# Patient Record
Sex: FEMALE | Race: WHITE | HISPANIC OR LATINO | URBAN - METROPOLITAN AREA
[De-identification: names, ages, dates, MRNs, and addresses within clinical notes are randomized per-mention and may not be internally consistent; named-entity substitution may affect disease eponyms.]

---

## 2018-07-21 ENCOUNTER — EMERGENCY (EMERGENCY)
Facility: HOSPITAL | Age: 58
LOS: 1 days | Discharge: HOME | End: 2018-07-21
Attending: EMERGENCY MEDICINE
Payer: COMMERCIAL

## 2018-07-21 VITALS
SYSTOLIC BLOOD PRESSURE: 110 MMHG | RESPIRATION RATE: 16 BRPM | OXYGEN SATURATION: 99 % | HEIGHT: 60 IN | DIASTOLIC BLOOD PRESSURE: 68 MMHG | WEIGHT: 126.99 LBS | TEMPERATURE: 99 F | HEART RATE: 52 BPM

## 2018-07-21 LAB
ALBUMIN SERPL ELPH-MCNC: 3.8 G/DL — SIGNIFICANT CHANGE UP (ref 3.5–5)
ALP SERPL-CCNC: 105 U/L — SIGNIFICANT CHANGE UP (ref 40–120)
ALT FLD-CCNC: 30 U/L DA — SIGNIFICANT CHANGE UP (ref 10–60)
ANION GAP SERPL CALC-SCNC: 8 MMOL/L — SIGNIFICANT CHANGE UP (ref 5–17)
AST SERPL-CCNC: 18 U/L — SIGNIFICANT CHANGE UP (ref 10–40)
BILIRUB SERPL-MCNC: 0.4 MG/DL — SIGNIFICANT CHANGE UP (ref 0.2–1.2)
BUN SERPL-MCNC: 13 MG/DL — SIGNIFICANT CHANGE UP (ref 7–18)
CALCIUM SERPL-MCNC: 9.1 MG/DL — SIGNIFICANT CHANGE UP (ref 8.4–10.5)
CHLORIDE SERPL-SCNC: 111 MMOL/L — HIGH (ref 96–108)
CO2 SERPL-SCNC: 24 MMOL/L — SIGNIFICANT CHANGE UP (ref 22–31)
CREAT SERPL-MCNC: 0.79 MG/DL — SIGNIFICANT CHANGE UP (ref 0.5–1.3)
GLUCOSE SERPL-MCNC: 105 MG/DL — HIGH (ref 70–99)
HCT VFR BLD CALC: 40.2 % — SIGNIFICANT CHANGE UP (ref 34.5–45)
HGB BLD-MCNC: 13.2 G/DL — SIGNIFICANT CHANGE UP (ref 11.5–15.5)
MCHC RBC-ENTMCNC: 31 PG — SIGNIFICANT CHANGE UP (ref 27–34)
MCHC RBC-ENTMCNC: 32.8 GM/DL — SIGNIFICANT CHANGE UP (ref 32–36)
MCV RBC AUTO: 94.4 FL — SIGNIFICANT CHANGE UP (ref 80–100)
PLATELET # BLD AUTO: 345 K/UL — SIGNIFICANT CHANGE UP (ref 150–400)
POTASSIUM SERPL-MCNC: 3.9 MMOL/L — SIGNIFICANT CHANGE UP (ref 3.5–5.3)
POTASSIUM SERPL-SCNC: 3.9 MMOL/L — SIGNIFICANT CHANGE UP (ref 3.5–5.3)
PROT SERPL-MCNC: 7.3 G/DL — SIGNIFICANT CHANGE UP (ref 6–8.3)
RBC # BLD: 4.26 M/UL — SIGNIFICANT CHANGE UP (ref 3.8–5.2)
RBC # FLD: 12.9 % — SIGNIFICANT CHANGE UP (ref 10.3–14.5)
SODIUM SERPL-SCNC: 143 MMOL/L — SIGNIFICANT CHANGE UP (ref 135–145)
TROPONIN I SERPL-MCNC: <0.015 NG/ML — SIGNIFICANT CHANGE UP (ref 0–0.04)
WBC # BLD: 4.4 K/UL — SIGNIFICANT CHANGE UP (ref 3.8–10.5)
WBC # FLD AUTO: 4.4 K/UL — SIGNIFICANT CHANGE UP (ref 3.8–10.5)

## 2018-07-21 PROCEDURE — 99284 EMERGENCY DEPT VISIT MOD MDM: CPT

## 2018-07-21 PROCEDURE — 99283 EMERGENCY DEPT VISIT LOW MDM: CPT | Mod: 25

## 2018-07-21 PROCEDURE — 84484 ASSAY OF TROPONIN QUANT: CPT

## 2018-07-21 PROCEDURE — 80053 COMPREHEN METABOLIC PANEL: CPT

## 2018-07-21 PROCEDURE — 71046 X-RAY EXAM CHEST 2 VIEWS: CPT

## 2018-07-21 PROCEDURE — 85027 COMPLETE CBC AUTOMATED: CPT

## 2018-07-21 PROCEDURE — 71046 X-RAY EXAM CHEST 2 VIEWS: CPT | Mod: 26

## 2018-07-21 PROCEDURE — 36415 COLL VENOUS BLD VENIPUNCTURE: CPT

## 2018-07-21 PROCEDURE — 93005 ELECTROCARDIOGRAM TRACING: CPT

## 2018-07-21 NOTE — ED PROVIDER NOTE - OBJECTIVE STATEMENT
58 year old female with pmh anxiety presents with multiple days of left arm pain associated with anxiety. no cough no fever no sob. no abd pain.  patient states she had been under increased stress

## 2018-07-21 NOTE — ED PROVIDER NOTE - CHPI ED SYMPTOMS NEG
no nausea/no tingling/no weakness/no vomiting/no decreased eating/drinking/no fever/no chills/no dizziness/no numbness

## 2018-07-21 NOTE — ED PROVIDER NOTE - MEDICAL DECISION MAKING DETAILS
chest pain associated with anxiety. will rule out acs with 1 trop given length of time symptoms have been present. patient has cards f/u this afternoon

## 2018-07-21 NOTE — ED ADULT TRIAGE NOTE - CHIEF COMPLAINT QUOTE
L arm pressure and numbness and tingling for weeks, hx  of anxiety. stated," I have lot of stress lately"

## 2019-09-07 ENCOUNTER — TRANSCRIPTION ENCOUNTER (OUTPATIENT)
Age: 59
End: 2019-09-07

## 2021-05-25 ENCOUNTER — NON-APPOINTMENT (OUTPATIENT)
Age: 61
End: 2021-05-25

## 2021-06-23 ENCOUNTER — APPOINTMENT (OUTPATIENT)
Dept: CARDIOLOGY | Facility: CLINIC | Age: 61
End: 2021-06-23

## 2021-06-25 ENCOUNTER — APPOINTMENT (OUTPATIENT)
Dept: CARDIOLOGY | Facility: CLINIC | Age: 61
End: 2021-06-25
Payer: COMMERCIAL

## 2021-06-25 ENCOUNTER — NON-APPOINTMENT (OUTPATIENT)
Age: 61
End: 2021-06-25

## 2021-06-25 VITALS
TEMPERATURE: 97.8 F | HEIGHT: 60 IN | DIASTOLIC BLOOD PRESSURE: 76 MMHG | WEIGHT: 129 LBS | SYSTOLIC BLOOD PRESSURE: 146 MMHG | OXYGEN SATURATION: 97 % | BODY MASS INDEX: 25.32 KG/M2 | HEART RATE: 56 BPM

## 2021-06-25 VITALS — SYSTOLIC BLOOD PRESSURE: 146 MMHG | DIASTOLIC BLOOD PRESSURE: 70 MMHG

## 2021-06-25 DIAGNOSIS — M48.00 SPINAL STENOSIS, SITE UNSPECIFIED: ICD-10-CM

## 2021-06-25 DIAGNOSIS — K21.9 GASTRO-ESOPHAGEAL REFLUX DISEASE W/OUT ESOPHAGITIS: ICD-10-CM

## 2021-06-25 DIAGNOSIS — Z78.9 OTHER SPECIFIED HEALTH STATUS: ICD-10-CM

## 2021-06-25 DIAGNOSIS — M41.9 SCOLIOSIS, UNSPECIFIED: ICD-10-CM

## 2021-06-25 DIAGNOSIS — M54.30 SCIATICA, UNSPECIFIED SIDE: ICD-10-CM

## 2021-06-25 DIAGNOSIS — Z82.49 FAMILY HISTORY OF ISCHEMIC HEART DISEASE AND OTHER DISEASES OF THE CIRCULATORY SYSTEM: ICD-10-CM

## 2021-06-25 DIAGNOSIS — M62.838 OTHER MUSCLE SPASM: ICD-10-CM

## 2021-06-25 PROCEDURE — 93000 ELECTROCARDIOGRAM COMPLETE: CPT

## 2021-06-25 PROCEDURE — 99204 OFFICE O/P NEW MOD 45 MIN: CPT

## 2021-06-25 RX ORDER — CLONAZEPAM 0.5 MG/1
0.5 TABLET ORAL
Refills: 0 | Status: ACTIVE | COMMUNITY
Start: 2021-06-02

## 2021-06-27 NOTE — PHYSICAL EXAM
[Well Developed] : well developed [Well Nourished] : well nourished [Normal Conjunctiva] : normal conjunctiva [No Acute Distress] : no acute distress [Normal Venous Pressure] : normal venous pressure [No Carotid Bruit] : no carotid bruit [Normal S1, S2] : normal S1, S2 [Clear Lung Fields] : clear lung fields [Good Air Entry] : good air entry [No Respiratory Distress] : no respiratory distress  [Normal Gait] : normal gait [No Edema] : no edema [No Cyanosis] : no cyanosis [No Clubbing] : no clubbing [No Varicosities] : no varicosities [No Rash] : no rash [No Skin Lesions] : no skin lesions [Moves all extremities] : moves all extremities [No Focal Deficits] : no focal deficits [Normal Speech] : normal speech [Alert and Oriented] : alert and oriented [Normal memory] : normal memory

## 2021-06-28 NOTE — CARDIOLOGY SUMMARY
[de-identified] : 6/25/21 SB NSST [de-identified] : 5/2020 NST [place], 10 min, negative for ischemia [de-identified] : 7/2020 Mild aortic valve sclerosis with out stenosis, mod MR, mild TR  [de-identified] : Non-obstructive calcified plaque

## 2021-06-28 NOTE — REASON FOR VISIT
[Other: ____] : [unfilled] [FreeTextEntry1] : This is a 60 year female with the below PMH who presents to office as a new patient looking to establish\par routine cardiovascular care. There has been no recent illness or hospitalization.\par \par Pt had been under the care of Dr. Mae in [place], she recently move out east in [place] and is looking for local Cardiologist, her family had recommended Dr. Santana.\par \par \par She denies chest pain, pressure, palpitations, unusual shortness of breath, orthopnea, LE edema, lightheadedness, dizziness, near syncope or syncope.\par \par Blood pressure reviewed by myself 160/90 at rest.  Patient states she is anxious and did not relax when learning previous blood pressure 140 systolic.  Patient states she has never had high blood pressure readings in the past. \par

## 2021-06-28 NOTE — DISCUSSION/SUMMARY
[FreeTextEntry1] : This is a 60 year F with the above PMH and below problems were addressed during today's cardiovascular care visit.\par Patient verbalizes they understand the plan and any questions and concerns were addressed.\par \par Elevated blood pressure reading without the diagnosis of hypertension:\par Education given to encouraged her to maintain a low salt diet (<2grams/day) and goal to maintain  <130 systolic blood pressure\par Recommended patient take blood pressure readings at home and trend vital signs blood pressure readings to be presented at next follow-up visit.  Patient states she was anxious when learning previous blood pressure was mildly elevated.\par \par Testing reviewed today included previous echo, nuclear stress test, carotid ultrasound, and EKG.  No significant findings at this time we will continue with surveillance monitoring.\par \par \par Follow up with our office in 6  months unless otherwise indicated,\par Discussed red flag symptoms, which would warrant sooner or emergent medical evaluation.\par \par \par Sincerely,\par \par LIOR DavenportC \par Patients history, testing, and plan reviewed with supervising MD: Dr. Román Santana\par \par

## 2021-07-01 ENCOUNTER — NON-APPOINTMENT (OUTPATIENT)
Age: 61
End: 2021-07-01

## 2021-07-02 ENCOUNTER — APPOINTMENT (OUTPATIENT)
Dept: CARDIOLOGY | Facility: CLINIC | Age: 61
End: 2021-07-02
Payer: COMMERCIAL

## 2021-07-02 VITALS
HEIGHT: 60 IN | HEART RATE: 69 BPM | DIASTOLIC BLOOD PRESSURE: 80 MMHG | SYSTOLIC BLOOD PRESSURE: 130 MMHG | OXYGEN SATURATION: 97 % | WEIGHT: 132 LBS | TEMPERATURE: 98.2 F | BODY MASS INDEX: 25.91 KG/M2

## 2021-07-02 DIAGNOSIS — R01.1 CARDIAC MURMUR, UNSPECIFIED: ICD-10-CM

## 2021-07-02 PROCEDURE — 99214 OFFICE O/P EST MOD 30 MIN: CPT

## 2021-07-02 RX ORDER — TIZANIDINE HYDROCHLORIDE 4 MG/1
4 CAPSULE ORAL
Refills: 0 | Status: ACTIVE | COMMUNITY
Start: 2021-06-25

## 2021-07-02 RX ORDER — METOPROLOL TARTRATE 50 MG/1
50 TABLET, FILM COATED ORAL DAILY
Refills: 0 | Status: DISCONTINUED | COMMUNITY
End: 2021-07-02

## 2021-07-02 NOTE — REASON FOR VISIT
[Hypertension] : hypertension [FreeTextEntry1] : \par 62 yo F with recently diagnosed HTN here for follow up for BP optimization. No chest pain/dyspnea. BP uncontrolled at home however she has incorrect technique of checking it. Started toprol 25. Works out regularly. \par \par Cardiographics 2020 reviewed. Carotid, stress test, echo.  Lab work reviewed from April 2021:.\par

## 2021-07-02 NOTE — PHYSICAL EXAM
[Well Developed] : well developed [Well Nourished] : well nourished [No Acute Distress] : no acute distress [Normal Conjunctiva] : normal conjunctiva [Normal Venous Pressure] : normal venous pressure [No Carotid Bruit] : no carotid bruit [Normal S1, S2] : normal S1, S2 [Clear Lung Fields] : clear lung fields [Good Air Entry] : good air entry [No Respiratory Distress] : no respiratory distress  [Normal Gait] : normal gait [No Edema] : no edema [No Cyanosis] : no cyanosis [No Clubbing] : no clubbing [No Varicosities] : no varicosities [No Rash] : no rash [No Skin Lesions] : no skin lesions [Moves all extremities] : moves all extremities [No Focal Deficits] : no focal deficits [Normal Speech] : normal speech [Alert and Oriented] : alert and oriented [Normal memory] : normal memory

## 2021-07-02 NOTE — DISCUSSION/SUMMARY
[FreeTextEntry1] : 62 yo F with recently diagnosed HTN here for follow up for BP optimization. No chest pain/dyspnea. BP uncontrolled at home however she has incorrect technique of checking it. Started toprol 25. Works out regularly. Cardiographics 2020 reviewed. Carotid, stress test, echo.  Lab work reviewed from April 2021:.\par \par \par stop metoprolol\par \par # HTN, no structural CV abnormality and nonischemic stress test 2020: \par - stop metoprolol switch to amlodipine 5 mg, BP check in 1 month, full CV visit in 6 months, adapt plan if needed\par - gave her the correct technique to measure her BP at home.  Maintain blood pressure log still.  Anxiety control\par - Education given to encouraged her to maintain a low salt diet (<2grams/day)\par - yearly labwork\par \par \par BP check in 1 month, full CV visit in 6 months, adapt plan if needed

## 2021-07-02 NOTE — CARDIOLOGY SUMMARY
[de-identified] : 6/25/21 SB NSST [de-identified] : 5/2020 NST [place], 10 min, negative for ischemia [de-identified] : 7/2020 Mild aortic valve sclerosis with out stenosis, mod MR, mild TR  [de-identified] : Non-obstructive calcified plaque

## 2021-08-03 ENCOUNTER — APPOINTMENT (OUTPATIENT)
Dept: CARDIOLOGY | Facility: CLINIC | Age: 61
End: 2021-08-03
Payer: COMMERCIAL

## 2021-08-03 VITALS
WEIGHT: 130 LBS | HEIGHT: 60 IN | SYSTOLIC BLOOD PRESSURE: 150 MMHG | TEMPERATURE: 97.7 F | DIASTOLIC BLOOD PRESSURE: 80 MMHG | OXYGEN SATURATION: 98 % | BODY MASS INDEX: 25.52 KG/M2 | HEART RATE: 64 BPM

## 2021-08-03 PROCEDURE — 99213 OFFICE O/P EST LOW 20 MIN: CPT

## 2021-08-03 RX ORDER — SUCRALFATE 1 G/10ML
1 SUSPENSION ORAL AT BEDTIME
Refills: 0 | Status: DISCONTINUED | COMMUNITY
Start: 2021-06-09 | End: 2021-08-03

## 2021-08-03 NOTE — DISCUSSION/SUMMARY
[FreeTextEntry1] : 62 yo F with recently diagnosed HTN here for follow up for BP optimization. No chest pain/dyspnea.  On last visit, BP was uncontrolled at home however she had incorrect technique of checking it and I switch metoprolol to amlodipine 5.\par In interim, BP log she kept BP scarcely but averaging 130s. Not taking NSAIDs often. BP here elevated. Anxious. started paxil in interim. pending therapist evaluation.\par \par \par # HTN, no structural CV abnormality and nonischemic stress test 2020: \par - on last visit, I stopped metoprolol switched to amlodipine 5 mg. TODAY INCREASE TO AMLODIPINE 10. Low on salt now.\par - gave her the correct technique to measure her BP at home.  Maintain blood pressure log still.  Anxiety control\par - Education given to encouraged her to maintain a low salt diet (<2grams/day)\par - yearly labwork next due 4/2022.\par \par \par CV visit in 6 months, adapt plan if needed. She will call with BP log at home.

## 2021-08-03 NOTE — CARDIOLOGY SUMMARY
[de-identified] : 6/25/21 SB NSST [de-identified] : 5/2020 NST [place], 10 min, negative for ischemia [de-identified] : 7/2020 Mild aortic valve sclerosis with out stenosis, mod MR, mild TR  [de-identified] : Non-obstructive calcified plaque

## 2022-02-16 ENCOUNTER — APPOINTMENT (OUTPATIENT)
Dept: CARDIOLOGY | Facility: CLINIC | Age: 62
End: 2022-02-16
Payer: COMMERCIAL

## 2022-02-16 VITALS
HEART RATE: 54 BPM | WEIGHT: 140 LBS | DIASTOLIC BLOOD PRESSURE: 70 MMHG | HEIGHT: 60 IN | TEMPERATURE: 97.3 F | SYSTOLIC BLOOD PRESSURE: 138 MMHG | OXYGEN SATURATION: 98 % | BODY MASS INDEX: 27.48 KG/M2

## 2022-02-16 DIAGNOSIS — F41.9 ANXIETY DISORDER, UNSPECIFIED: ICD-10-CM

## 2022-02-16 PROCEDURE — 99214 OFFICE O/P EST MOD 30 MIN: CPT

## 2022-02-16 RX ORDER — MELOXICAM 15 MG/1
15 TABLET ORAL
Refills: 0 | Status: ACTIVE | COMMUNITY

## 2022-02-16 RX ORDER — IBUPROFEN 600 MG/1
600 TABLET, FILM COATED ORAL
Refills: 0 | Status: DISCONTINUED | COMMUNITY
Start: 2021-05-28 | End: 2022-02-16

## 2022-02-16 NOTE — HISTORY OF PRESENT ILLNESS
[FreeTextEntry1] : \par 60 yo F with recently diagnosed HTN here for follow up. No chest pain/dyspnea.\par \par INTERIM: edema due to amlodipine 10, tolerating 5. 10 y ascvd 2.59%. taking meloxicam 3-5 x a week for back pain.\par \par PRIOR:  On last visit, BP was uncontrolled at home however she had incorrect technique of checking it and I switch metoprolol to amlodipine 5. Anxious. started paxil in interim. pending therapist evaluation.\par \par \par TESTING:\par 10/2021 labwork: . a1c normal. grossly normal cbc/cmp. \par \par Cardiographics 2020 reviewed. unremarkable Carotid, stress test, echo.  Lab work reviewed from April 2021.\par

## 2022-02-16 NOTE — DISCUSSION/SUMMARY
[FreeTextEntry1] : \par 60 yo F with recently diagnosed HTN here for follow up. INTERIM: edema due to amlodipine 10. 10 y ascvd 2.59%. meloxicam 3-5 x a week for back pain.\par \par \par # HTN, no structural CV abnormality and nonischemic stress test 2020: mildly elevated in office but better controlled at home.\par - continue amlodipine 5 (edema on 10). BP log. decrease NSAID use. \par - Education given to encouraged her to maintain a low salt diet (<2grams/day)\par - yearly labwork next due 10/2022\par \par \par CV visit in 1 year, adapt plan if needed. She will call with BP log at home.

## 2022-02-16 NOTE — CARDIOLOGY SUMMARY
[de-identified] : 6/25/21 SB NSST [de-identified] : 5/2020 NST [place], 10 min, negative for ischemia [de-identified] : 7/2020 Mild aortic valve sclerosis with out stenosis, mod MR, mild TR  [de-identified] : Non-obstructive calcified plaque

## 2022-07-13 ENCOUNTER — NON-APPOINTMENT (OUTPATIENT)
Age: 62
End: 2022-07-13

## 2022-07-15 ENCOUNTER — NON-APPOINTMENT (OUTPATIENT)
Age: 62
End: 2022-07-15

## 2022-07-26 ENCOUNTER — NON-APPOINTMENT (OUTPATIENT)
Age: 62
End: 2022-07-26

## 2022-09-22 ENCOUNTER — APPOINTMENT (OUTPATIENT)
Dept: ORTHOPEDIC SURGERY | Facility: CLINIC | Age: 62
End: 2022-09-22

## 2022-09-22 ENCOUNTER — TRANSCRIPTION ENCOUNTER (OUTPATIENT)
Age: 62
End: 2022-09-22

## 2022-09-22 VITALS — WEIGHT: 138 LBS | BODY MASS INDEX: 27.09 KG/M2 | HEIGHT: 60 IN

## 2022-09-22 PROCEDURE — 73562 X-RAY EXAM OF KNEE 3: CPT | Mod: RT

## 2022-09-22 PROCEDURE — 99204 OFFICE O/P NEW MOD 45 MIN: CPT

## 2022-09-22 NOTE — PHYSICAL EXAM
[Right] : right knee [5___] : hamstring 5[unfilled]/5 [] : non-antalgic [TWNoteComboBox7] : flexion 120 degrees [de-identified] : extension 0 degrees

## 2022-09-22 NOTE — HISTORY OF PRESENT ILLNESS
[de-identified] : Date of Injury/Onset:      6 months\par Pain: At Rest: 0 /10   \par With Activity: 0 /10 \par Affecting Sleep:NO\par Difficulty with stairs: NO\par Difficulty getting in and out of car: NO\par Sit to stand stiffness: YES\par Mechanism of injury:  NKI\par This  is not a Work Related Injury being treated under Worker's Compensation.\par This  is not   an athletic injury occurring associated with an interscholastic or organized sports team.\par Quality of symptoms: C/O CHRONIC SWELLING IN RIGHT KNEE.  NO PAIN\par Improves with:    NOTHING\par Worse with:    \par Previous Treatment/Imaging/Studies Since Last Visit: USING ADVIL\par Reports Available For Review Today: NONE\par \par \par  \par \par

## 2022-09-22 NOTE — DISCUSSION/SUMMARY
[de-identified] : MILD PREPATELLA BURSITIS\par Discussed importance of non-impact exercise and muscle stretching before and after exercise. Reviewed x-rays and knee model. Explained the importance of ice and rest. \par

## 2022-11-22 ENCOUNTER — APPOINTMENT (OUTPATIENT)
Dept: ORTHOPEDIC SURGERY | Facility: CLINIC | Age: 62
End: 2022-11-22

## 2022-11-22 VITALS — HEIGHT: 60 IN | WEIGHT: 138 LBS | BODY MASS INDEX: 27.09 KG/M2

## 2022-11-22 DIAGNOSIS — S43.422A SPRAIN OF LEFT ROTATOR CUFF CAPSULE, INITIAL ENCOUNTER: ICD-10-CM

## 2022-11-22 DIAGNOSIS — M75.42 IMPINGEMENT SYNDROME OF LEFT SHOULDER: ICD-10-CM

## 2022-11-22 PROCEDURE — 99213 OFFICE O/P EST LOW 20 MIN: CPT

## 2022-11-22 PROCEDURE — 73030 X-RAY EXAM OF SHOULDER: CPT | Mod: LT

## 2022-11-22 NOTE — PHYSICAL EXAM
[Left] : left shoulder [] : motor and sensory intact distally [FreeTextEntry8] : tenderness over biceps anteriorly and the supraspinatus [FreeTextEntry9] : internal rotation to T12 [TWNoteComboBox7] : active forward flexion 170 degrees [de-identified] : external rotation 90 degrees

## 2022-11-22 NOTE — DISCUSSION/SUMMARY
[de-identified] : Patient has an MRI of Left shoulder from [facility] in 2020. Need to obtain report and send it over to us\par Patient will go to physical therapy - script given in office today\par She will follow up after the report is obtained

## 2022-11-22 NOTE — HISTORY OF PRESENT ILLNESS
[de-identified] : Date of Injury/Onset:      1 year after going to PT for a bicep tear\par Pain: At Rest:  0/10   \par With Activity:  7/10 \par Affecting Sleep: sometines\par Difficulty with stairs: N/A\par Difficulty getting in and out of car: N/A\par Sit to stand stiffness: N/A\par Mechanism of injury:  \par This  is not a Work Related Injury being treated under Worker's Compensation.\par This is not   an athletic injury occurring associated with an interscholastic or organized sports team.\par Quality of symptoms: sharp, squeezing, tightness\par Improves with:    tigerbalm and hot showers\par Worse with:    lifting\par Previous Treatment/Imaging/Studies Since Last Visit: Left Shoulder MRI from 2020\par PT was helping her in 2020\par Using Tigerbalm daily.\par  \par

## 2022-11-29 ENCOUNTER — FORM ENCOUNTER (OUTPATIENT)
Age: 62
End: 2022-11-29

## 2023-02-06 ENCOUNTER — NON-APPOINTMENT (OUTPATIENT)
Age: 63
End: 2023-02-06

## 2023-02-06 ENCOUNTER — APPOINTMENT (OUTPATIENT)
Dept: CARDIOLOGY | Facility: CLINIC | Age: 63
End: 2023-02-06
Payer: COMMERCIAL

## 2023-02-06 VITALS
OXYGEN SATURATION: 100 % | DIASTOLIC BLOOD PRESSURE: 76 MMHG | TEMPERATURE: 96.9 F | HEIGHT: 60 IN | SYSTOLIC BLOOD PRESSURE: 140 MMHG | WEIGHT: 138 LBS | HEART RATE: 71 BPM | BODY MASS INDEX: 27.09 KG/M2

## 2023-02-06 PROCEDURE — 93000 ELECTROCARDIOGRAM COMPLETE: CPT

## 2023-02-06 PROCEDURE — 99214 OFFICE O/P EST MOD 30 MIN: CPT | Mod: 25

## 2023-02-06 RX ORDER — OMEPRAZOLE 20 MG/1
20 CAPSULE, DELAYED RELEASE ORAL
Refills: 0 | Status: DISCONTINUED | COMMUNITY
Start: 2021-06-09 | End: 2023-02-06

## 2023-02-06 NOTE — CARDIOLOGY SUMMARY
[de-identified] : 6/25/21 SB NSST [de-identified] : 5/2020 NST [place], 10 min, negative for ischemia [de-identified] : 7/2020 Mild aortic valve sclerosis with out stenosis, mod MR, mild TR  [de-identified] : Non-obstructive calcified plaque

## 2023-02-06 NOTE — DISCUSSION/SUMMARY
[FreeTextEntry1] : \par 61 yo F with recently diagnosed HTN here for follow up. \par \par left shoulder pain seeing orthopedics. not taking nsaid. no angina or dyspnea. limiting issue is orthopedic/neurologic. 2+ pulses. \par \par # HTN, no structural CV abnormality and nonischemic stress test 2020: mildly elevated in office but better controlled at home.\par - continue amlodipine 5 (edema on 10). BP log. \par - Education given to encouraged her to maintain a low salt diet (<2grams/day)\par - serial labwork\par - CT CALCIUM SCORE for risk stratification \par \par \par CV visit in 1 year, adapt plan if needed. She will call with BP log at home. Call with CT calcium result.\par

## 2023-02-06 NOTE — HISTORY OF PRESENT ILLNESS
[FreeTextEntry1] : \par 61 yo F with recently diagnosed HTN here for follow up. \par \par 2/2023 VISIT: left shoulder pain seeing orthopedics. not taking nsaid. no angina or dyspnea. limiting issue is orthopedic/neurologic. 2+ pulses. \par \par 2/2022 VISIT: edema due to amlodipine 10, tolerating 5. 10 y ascvd 2.59%. taking meloxicam 3-5 x a week for back pain.\par PRIOR:  On last visit, BP was uncontrolled at home however she had incorrect technique of checking it and I switch metoprolol to amlodipine 5. Anxious. started paxil in interim. pending therapist evaluation.\par \par \par TESTING:\par \par 6/2022 LABWORK: LDL 82.  Grossly normal CMP/TFT.\par \par 10/2021 labwork: . a1c normal. grossly normal cbc/cmp. \par \par Cardiographics 2020 reviewed. unremarkable Carotid, stress test, echo.  Lab work reviewed from April 2021.\par

## 2023-02-07 ENCOUNTER — NON-APPOINTMENT (OUTPATIENT)
Age: 63
End: 2023-02-07

## 2023-02-21 ENCOUNTER — NON-APPOINTMENT (OUTPATIENT)
Age: 63
End: 2023-02-21

## 2023-03-07 ENCOUNTER — FORM ENCOUNTER (OUTPATIENT)
Age: 63
End: 2023-03-07

## 2023-04-18 ENCOUNTER — APPOINTMENT (OUTPATIENT)
Dept: ORTHOPEDIC SURGERY | Facility: CLINIC | Age: 63
End: 2023-04-18

## 2023-05-10 ENCOUNTER — APPOINTMENT (OUTPATIENT)
Dept: ORTHOPEDIC SURGERY | Facility: CLINIC | Age: 63
End: 2023-05-10

## 2023-05-10 ENCOUNTER — APPOINTMENT (OUTPATIENT)
Dept: ORTHOPEDIC SURGERY | Facility: CLINIC | Age: 63
End: 2023-05-10
Payer: COMMERCIAL

## 2023-05-10 DIAGNOSIS — M70.61 TROCHANTERIC BURSITIS, RIGHT HIP: ICD-10-CM

## 2023-05-10 DIAGNOSIS — M16.12 UNILATERAL PRIMARY OSTEOARTHRITIS, LEFT HIP: ICD-10-CM

## 2023-05-10 DIAGNOSIS — I10 ESSENTIAL (PRIMARY) HYPERTENSION: ICD-10-CM

## 2023-05-10 DIAGNOSIS — M16.10 UNILATERAL PRIMARY OSTEOARTHRITIS, UNSPECIFIED HIP: ICD-10-CM

## 2023-05-10 PROCEDURE — 99214 OFFICE O/P EST MOD 30 MIN: CPT

## 2023-05-10 RX ORDER — GABAPENTIN 300 MG/1
300 CAPSULE ORAL
Refills: 0 | Status: DISCONTINUED | COMMUNITY
Start: 2021-06-25 | End: 2023-05-10

## 2023-05-10 RX ORDER — TRAMADOL HYDROCHLORIDE 50 MG/1
50 TABLET, COATED ORAL
Qty: 28 | Refills: 0 | Status: DISCONTINUED | COMMUNITY
Start: 2022-11-01 | End: 2023-05-10

## 2023-05-10 RX ORDER — PREGABALIN 75 MG/1
75 CAPSULE ORAL
Qty: 60 | Refills: 0 | Status: DISCONTINUED | COMMUNITY
Start: 2022-11-01 | End: 2023-05-10

## 2023-05-10 NOTE — PHYSICAL EXAM
[Bilateral] : hip with pelvis bilaterally [Outside films reviewed] : Outside films reviewed [de-identified] : Constitutional: The patient appears well developed, well nourished. Examination of patients ability to communicate functionally was normal.  \par \par  \par \par Neurologic: Coordination is normal. Alert and oriented to time, place and person. No evidence of mood disorder, calm affect.  \par \par  \par \par  RIGHT     HIP/THIGH : Inspection of the hip/thigh is as follows: Inspection shows no swelling, no ecchymosis, no erythema, no rashes and no masses.  \par \par  \par \par Palpation of the hip/thigh is as follows: groin tenderness and GREATER TROCH TTP. no palpable defects and no palpable masses.  \par \par  \par \par Range of motion of the hip is as follows in degrees:  \par \par  \par \par Flexion: 100   \par \par Abduction:  20   \par \par External rotation:  30  \par \par Internal rotaion:  10 \par \par  \par \par Stength testing of the hip/thigh is as follows: \par \par Hip flexion strength:   5/5  \par \par Hip extension strength:  5/5  \par \par Hip abductionstrength:  5/5 \par \par Hip adductionstrength:  5/5 \par \par  \par \par Neurological testing of the hip/thigh is as follows: motor exam 5/5 throughout, light touch intact throughout and no focal motor defecits.  \par \par  \par \par Gait and function is as follows: mildly antalgic gait.  \par \par  \par \par   [FreeTextEntry9] : ap/lat b/l hips show moderate b/l hip DJD joint space narrowing, spurring of the femoral head and CAM lesions b/l

## 2023-05-10 NOTE — DISCUSSION/SUMMARY
[de-identified] : reviewed XR images of hip and reviewed MRI images of hip and lumbar spine\par Options were discussed today including oral anti-inflammatories, Physical Therapy, IA Steroid Injection, oral steroids. The patient had time to ask questions of the different treatment options, including doing nothing and just observing for a finite period of time and re-evaluating in the future. \par Recommend CSI IA - script given\par pt will f/u one month after injection\par pt will try naproxen\par \par Entered by Jane Romero acting as scribe. \par Dr. Guillen Attestation\par The documentation recorded by the scribe, in my presence, accurately reflects the service I, Dr. Guillen, personally performed, and the decisions made by me with my edits as appropriate.\par

## 2023-05-10 NOTE — HISTORY OF PRESENT ILLNESS
[de-identified] : Patient is here today for her right hip.\par \par Had MRI Of LSpine, MRI of R Hip at Stand up, Xray of B/l Hips.\par Doppler was done on March 29th, pt reports this came back negative.\par \par Seeing Neurology currently\par \par Has not returned to pain management. \par \par Patient c/o Right hiup pain lateral into the groin and posterior aspect of the hip with any activity. She notes decreased ROM of the hips.  The Right is much worse than the Left.  \par \par \par \par

## 2023-05-11 ENCOUNTER — RESULT REVIEW (OUTPATIENT)
Age: 63
End: 2023-05-11

## 2023-05-12 RX ORDER — ACETAMINOPHEN AND CODEINE PHOSPHATE 300; 30 MG/1; MG/1
300-30 TABLET ORAL
Qty: 30 | Refills: 0 | Status: ACTIVE | COMMUNITY
Start: 2023-05-12 | End: 1900-01-01

## 2023-06-12 ENCOUNTER — APPOINTMENT (OUTPATIENT)
Dept: ORTHOPEDIC SURGERY | Facility: CLINIC | Age: 63
End: 2023-06-12
Payer: COMMERCIAL

## 2023-06-12 PROCEDURE — 99214 OFFICE O/P EST MOD 30 MIN: CPT

## 2023-06-12 NOTE — DISCUSSION/SUMMARY
[de-identified] : Options were discussed today including oral anti-inflammatories, Physical Therapy, Steroid Injection, Hyalgan injections or Surgery. The patient had time to ask questions of the different treatment options, including doing nothing and just observing for a finite period of time and re-evaluating in the future. \par Patient was given an prescription for a Medrol dose cesar. They were instructed to take the medication as directed and to f/u in 1 month\par \par \par Entered by Cheo Goldstein acting as scribe.\par Dr. Guillen Attestation\par The documentation recorded by the scribe, in my presence, accurately reflects the service I, Dr. Guillen, personally performed, and the decisions made by me with my edits as appropriate.

## 2023-06-12 NOTE — PHYSICAL EXAM
[de-identified] : Constitutional: The patient appears well developed, well nourished. Examination of patients ability to communicate functionally was normal.  \par \par  \par \par Neurologic: Coordination is normal. Alert and oriented to time, place and person. No evidence of mood disorder, calm affect.  \par \par  \par \par  RIGHT     HIP/THIGH : Inspection of the hip/thigh is as follows: Inspection shows no swelling, no ecchymosis, no erythema, no rashes and no masses.  \par \par  \par \par Palpation of the hip/thigh is as follows: groin tenderness and GREATER TROCH TTP. no palpable defects and no palpable masses.  \par \par  \par \par Range of motion of the hip is as follows in degrees:  \par \par  \par \par Flexion: 100   \par \par Abduction:  20   \par \par External rotation:  30  \par \par Internal rotaion:  10 \par \par  \par \par Stength testing of the hip/thigh is as follows: \par \par Hip flexion strength:   5/5  \par \par Hip extension strength:  5/5  \par \par Hip abductionstrength:  5/5 \par \par Hip adductionstrength:  5/5 \par \par  \par \par Neurological testing of the hip/thigh is as follows: motor exam 5/5 throughout, light touch intact throughout and no focal motor defecits.  \par \par  \par \par Gait and function is as follows: mildly antalgic gait.  \par \par  \par \par   [Bilateral] : hip with pelvis bilaterally [Outside films reviewed] : Outside films reviewed [FreeTextEntry9] : ap/lat b/l hips show moderate b/l hip DJD joint space narrowing, spurring of the femoral head and CAM lesions b/l

## 2023-06-12 NOTE — HISTORY OF PRESENT ILLNESS
[de-identified] : Patient is f/u after right hip IA CSI on 5/11/23   . Patient reports this provided no relief for the days following the injection. Patient reports that the pain is radiating down the right leg on the posterior portion of the thigh. \par Patient is going to PM for her lumbar spine. \par Patient will take Percocet for breakthrough pain, also currently taking meloxicam given by neuro. \par Patient took naproxen following last visit, denies this provided relief\par Biofreeze and tiger balm daily, with some relief.

## 2023-07-17 ENCOUNTER — APPOINTMENT (OUTPATIENT)
Dept: ORTHOPEDIC SURGERY | Facility: CLINIC | Age: 63
End: 2023-07-17
Payer: COMMERCIAL

## 2023-07-17 VITALS — HEIGHT: 60 IN | WEIGHT: 138 LBS | BODY MASS INDEX: 27.09 KG/M2

## 2023-07-17 PROCEDURE — 99214 OFFICE O/P EST MOD 30 MIN: CPT

## 2023-07-17 NOTE — DISCUSSION/SUMMARY
[de-identified] : Options were discussed today including oral anti-inflammatories, Physical Therapy, Steroid Injection, Hyalgan injections or Surgery. The patient had time to ask questions of the different treatment options, including doing nothing and just observing for a finite period of time and re-evaluating in the future. \par \par Plan at this time is for IA CSI in the Rt Hip, f/u in 1 month  \par \par Continue PT at this time\par \par Entered by Cheo Goldstein acting as scribe.\par Dr. Guillen Attestation\par The documentation recorded by the scribe, in my presence, accurately reflects the service I, Dr. Guillen, personally performed, and the decisions made by me with my edits as appropriate.

## 2023-07-17 NOTE — PHYSICAL EXAM
[de-identified] : Constitutional: The patient appears well developed, well nourished. Examination of patients ability to communicate functionally was normal.  \par \par  \par \par Neurologic: Coordination is normal. Alert and oriented to time, place and person. No evidence of mood disorder, calm affect.  \par \par  \par \par  RIGHT     HIP/THIGH : Inspection of the hip/thigh is as follows: Inspection shows no swelling, no ecchymosis, no erythema, no rashes and no masses.  \par \par  \par \par Palpation of the hip/thigh is as follows: groin tenderness and GREATER TROCH TTP. no palpable defects and no palpable masses.  \par \par  \par \par Range of motion of the hip is as follows in degrees:  \par \par  \par \par Flexion: 100   \par \par Abduction:  20   \par \par External rotation:  30  \par \par Internal rotaion:  10 \par \par  \par \par Stength testing of the hip/thigh is as follows: \par \par Hip flexion strength:   5/5  \par \par Hip extension strength:  5/5  \par \par Hip abductionstrength:  5/5 \par \par Hip adductionstrength:  5/5 \par \par  \par \par Neurological testing of the hip/thigh is as follows: motor exam 5/5 throughout, light touch intact throughout and no focal motor defecits.  \par \par  \par \par Gait and function is as follows: mildly antalgic gait.  \par \par  \par \par

## 2023-07-17 NOTE — HISTORY OF PRESENT ILLNESS
[de-identified] : follow up patient is here today for her RT Hip. Patient was given an prescription for a Medrol dose cesar.on 6/12/23. patient is currently going to PT for BL hips with good relief. . Pt also does HEP.  Pt had Fluoro guided injection of RT Hip.  Pt also took a Medrol dose pack in the last month.  She states the CSI IA did not help much but themedrol dose pack did help.  She had MRI Right hip 5/1/23 positive for severe hip DJD and labral tearing.

## 2023-08-04 NOTE — ED PROVIDER NOTE - ENMT, MLM
Airway patent, Nasal mucosa clear. Mouth with normal mucosa. Throat has no vesicles, no oropharyngeal exudates and uvula is midline.
FAMILY HISTORY:  No pertinent family history in first degree relatives

## 2023-08-11 ENCOUNTER — RESULT REVIEW (OUTPATIENT)
Age: 63
End: 2023-08-11

## 2023-08-22 ENCOUNTER — NON-APPOINTMENT (OUTPATIENT)
Age: 63
End: 2023-08-22

## 2023-08-23 ENCOUNTER — APPOINTMENT (OUTPATIENT)
Dept: ORTHOPEDIC SURGERY | Facility: CLINIC | Age: 63
End: 2023-08-23
Payer: COMMERCIAL

## 2023-08-23 ENCOUNTER — APPOINTMENT (OUTPATIENT)
Dept: CARDIOLOGY | Facility: CLINIC | Age: 63
End: 2023-08-23
Payer: COMMERCIAL

## 2023-08-23 VITALS
SYSTOLIC BLOOD PRESSURE: 140 MMHG | HEIGHT: 60 IN | BODY MASS INDEX: 27.09 KG/M2 | HEART RATE: 69 BPM | DIASTOLIC BLOOD PRESSURE: 82 MMHG | WEIGHT: 138 LBS | OXYGEN SATURATION: 96 %

## 2023-08-23 PROCEDURE — 99213 OFFICE O/P EST LOW 20 MIN: CPT

## 2023-08-23 PROCEDURE — 99214 OFFICE O/P EST MOD 30 MIN: CPT

## 2023-08-23 RX ORDER — METHYLPREDNISOLONE 4 MG/1
4 TABLET ORAL
Qty: 1 | Refills: 0 | Status: DISCONTINUED | COMMUNITY
Start: 2023-06-12 | End: 2023-08-23

## 2023-08-23 RX ORDER — NAPROXEN 500 MG/1
500 TABLET ORAL
Qty: 28 | Refills: 0 | Status: DISCONTINUED | COMMUNITY
Start: 2023-05-10 | End: 2023-08-23

## 2023-08-23 RX ORDER — PAROXETINE HYDROCHLORIDE 30 MG/1
30 TABLET, FILM COATED ORAL DAILY
Refills: 0 | Status: ACTIVE | COMMUNITY

## 2023-08-23 RX ORDER — FAMOTIDINE 10 MG/1
TABLET, FILM COATED ORAL
Refills: 0 | Status: DISCONTINUED | COMMUNITY
End: 2023-08-23

## 2023-08-23 NOTE — CARDIOLOGY SUMMARY
[de-identified] : 6/25/21 SB NSST [de-identified] : 5/2020 NST [place], 10 min, negative for ischemia [de-identified] : 7/2020 Mild aortic valve sclerosis with out stenosis, mod MR, mild TR  [de-identified] : Non-obstructive calcified plaque

## 2023-08-23 NOTE — HISTORY OF PRESENT ILLNESS
[de-identified] : follow up patient is here today for her RT Hip.  Stephanie had CSI RIGHT HIP IA 2 weeks ago 8/11/23. She is going to PT which she states helps a lot . She would like to continue her PT.

## 2023-08-23 NOTE — PHYSICAL EXAM
[de-identified] : Constitutional: The patient appears well developed, well nourished. Examination of patients ability to communicate functionally was normal.       Neurologic: Coordination is normal. Alert and oriented to time, place and person. No evidence of mood disorder, calm affect.        RIGHT     HIP/THIGH : Inspection of the hip/thigh is as follows: Inspection shows no swelling, no ecchymosis, no erythema, no rashes and no masses.       Palpation of the hip/thigh is as follows: groin tenderness and GREATER TROCH TTP. no palpable defects and no palpable masses.       Range of motion of the hip is as follows in degrees:       Flexion: 100     Abduction:  20     External rotation:  30    Internal rotaion:  10      Stength testing of the hip/thigh is as follows:   Hip flexion strength:   5/5    Hip extension strength:  5/5    Hip abductionstrength:  5/5   Hip adductionstrength:  5/5      Neurological testing of the hip/thigh is as follows: motor exam 5/5 throughout, light touch intact throughout and no focal motor defecits.       Gait and function is as follows: mildly antalgic gait.

## 2023-08-23 NOTE — DISCUSSION/SUMMARY
[FreeTextEntry1] : 62 yo F with recently diagnosed HTN here for follow up.   # Coronary artery calcification: 47. no structural CV abnormality and nonischemic stress test 2020: mildly elevated in office but better controlled at home. - cannot tolerate ccb. BP log.  - rec statin; she is hesitant to start but is amenable to atorvastatin 5.  - Education given to encouraged her to maintain a low salt diet (<2grams/day) - serial labwork  Follow in 6 months with bloodwork.  ER precautions given to patient.

## 2023-08-23 NOTE — HISTORY OF PRESENT ILLNESS
[FreeTextEntry1] : 64 yo F with recently diagnosed HTN here for follow up. coronary calcifications.   8/2023 VISIT:  bp better controlled outside office. +nsaids.  coronary calcification 47  labwork ldl 110, optimized cbc/cmp  2/2023 VISIT: left shoulder pain seeing orthopedics. not taking nsaid. no angina or dyspnea. limiting issue is orthopedic/neurologic. 2+ pulses.   2/2022 VISIT: edema due to amlodipine 10, tolerating 5. 10 y ascvd 2.59%. taking meloxicam 3-5 x a week for back pain. PRIOR:  On last visit, BP was uncontrolled at home however she had incorrect technique of checking it and I switch metoprolol to amlodipine 5. Anxious. started paxil in interim. pending therapist evaluation.   TESTING: excluding above.   6/2022 LABWORK: LDL 82.  Grossly normal CMP/TFT.  10/2021 labwork: . a1c normal. grossly normal cbc/cmp.   Cardiographics 2020 reviewed. unremarkable Carotid, stress test, echo.  Lab work reviewed from April 2021.

## 2023-08-24 RX ORDER — ATORVASTATIN CALCIUM 10 MG/1
10 TABLET, FILM COATED ORAL
Qty: 90 | Refills: 2 | Status: DISCONTINUED | COMMUNITY
Start: 2023-08-23 | End: 2023-08-24

## 2023-09-12 ENCOUNTER — APPOINTMENT (OUTPATIENT)
Dept: ORTHOPEDIC SURGERY | Facility: CLINIC | Age: 63
End: 2023-09-12
Payer: COMMERCIAL

## 2023-09-12 PROCEDURE — 99213 OFFICE O/P EST LOW 20 MIN: CPT

## 2023-09-12 PROCEDURE — 73560 X-RAY EXAM OF KNEE 1 OR 2: CPT | Mod: RT

## 2023-10-18 ENCOUNTER — APPOINTMENT (OUTPATIENT)
Dept: ORTHOPEDIC SURGERY | Facility: CLINIC | Age: 63
End: 2023-10-18
Payer: COMMERCIAL

## 2023-10-18 VITALS — BODY MASS INDEX: 27.09 KG/M2 | WEIGHT: 138 LBS | HEIGHT: 60 IN

## 2023-10-18 PROCEDURE — 73562 X-RAY EXAM OF KNEE 3: CPT | Mod: RT

## 2023-10-18 PROCEDURE — 73502 X-RAY EXAM HIP UNI 2-3 VIEWS: CPT

## 2023-10-18 PROCEDURE — 99213 OFFICE O/P EST LOW 20 MIN: CPT

## 2023-11-17 ENCOUNTER — RESULT REVIEW (OUTPATIENT)
Age: 63
End: 2023-11-17

## 2023-12-04 ENCOUNTER — APPOINTMENT (OUTPATIENT)
Dept: ORTHOPEDIC SURGERY | Facility: CLINIC | Age: 63
End: 2023-12-04
Payer: COMMERCIAL

## 2023-12-04 PROCEDURE — 99214 OFFICE O/P EST MOD 30 MIN: CPT | Mod: 25

## 2023-12-04 PROCEDURE — 20610 DRAIN/INJ JOINT/BURSA W/O US: CPT | Mod: RT

## 2024-02-15 ENCOUNTER — NON-APPOINTMENT (OUTPATIENT)
Age: 64
End: 2024-02-15

## 2024-02-27 PROBLEM — I25.10 CORONARY ARTERY CALCIFICATION: Status: ACTIVE | Noted: 2023-02-21

## 2024-02-27 PROBLEM — I10 BENIGN ESSENTIAL HYPERTENSION: Status: ACTIVE | Noted: 2021-07-02

## 2024-02-27 PROBLEM — Z87.891 FORMER SMOKER: Status: ACTIVE | Noted: 2021-06-25

## 2024-02-28 ENCOUNTER — NON-APPOINTMENT (OUTPATIENT)
Age: 64
End: 2024-02-28

## 2024-02-28 ENCOUNTER — APPOINTMENT (OUTPATIENT)
Dept: CARDIOLOGY | Facility: CLINIC | Age: 64
End: 2024-02-28
Payer: COMMERCIAL

## 2024-02-28 ENCOUNTER — APPOINTMENT (OUTPATIENT)
Dept: ORTHOPEDIC SURGERY | Facility: CLINIC | Age: 64
End: 2024-02-28
Payer: COMMERCIAL

## 2024-02-28 VITALS
SYSTOLIC BLOOD PRESSURE: 142 MMHG | HEART RATE: 66 BPM | HEIGHT: 60 IN | WEIGHT: 138 LBS | DIASTOLIC BLOOD PRESSURE: 82 MMHG | BODY MASS INDEX: 27.09 KG/M2 | OXYGEN SATURATION: 97 %

## 2024-02-28 DIAGNOSIS — I10 ESSENTIAL (PRIMARY) HYPERTENSION: ICD-10-CM

## 2024-02-28 DIAGNOSIS — I25.84 ATHEROSCLEROTIC HEART DISEASE OF NATIVE CORONARY ARTERY W/OUT ANGINA PECTORIS: ICD-10-CM

## 2024-02-28 DIAGNOSIS — M25.561 PAIN IN RIGHT KNEE: ICD-10-CM

## 2024-02-28 DIAGNOSIS — M16.11 UNILATERAL PRIMARY OSTEOARTHRITIS, RIGHT HIP: ICD-10-CM

## 2024-02-28 DIAGNOSIS — Z87.891 PERSONAL HISTORY OF NICOTINE DEPENDENCE: ICD-10-CM

## 2024-02-28 DIAGNOSIS — I25.10 ATHEROSCLEROTIC HEART DISEASE OF NATIVE CORONARY ARTERY W/OUT ANGINA PECTORIS: ICD-10-CM

## 2024-02-28 PROCEDURE — 99213 OFFICE O/P EST LOW 20 MIN: CPT

## 2024-02-28 PROCEDURE — 93000 ELECTROCARDIOGRAM COMPLETE: CPT

## 2024-02-28 PROCEDURE — 99214 OFFICE O/P EST MOD 30 MIN: CPT

## 2024-02-28 RX ORDER — TRAMADOL HYDROCHLORIDE 50 MG/1
50 TABLET, COATED ORAL 3 TIMES DAILY
Refills: 0 | Status: ACTIVE | COMMUNITY

## 2024-02-28 RX ORDER — AMLODIPINE BESYLATE 5 MG/1
5 TABLET ORAL DAILY
Qty: 90 | Refills: 2 | Status: ACTIVE | COMMUNITY
Start: 2021-07-02 | End: 1900-01-01

## 2024-02-28 RX ORDER — TAMSULOSIN HYDROCHLORIDE 0.4 MG/1
0.4 CAPSULE ORAL DAILY
Refills: 0 | Status: ACTIVE | COMMUNITY

## 2024-02-28 RX ORDER — HYDROXYZINE HYDROCHLORIDE 50 MG/1
50 TABLET ORAL DAILY
Refills: 0 | Status: ACTIVE | COMMUNITY

## 2024-02-28 RX ORDER — ROSUVASTATIN CALCIUM 5 MG/1
5 TABLET, FILM COATED ORAL DAILY
Qty: 90 | Refills: 2 | Status: ACTIVE | COMMUNITY
Start: 2023-08-24 | End: 1900-01-01

## 2024-02-28 RX ORDER — FAMOTIDINE 20 MG/1
20 TABLET, FILM COATED ORAL TWICE DAILY
Refills: 0 | Status: ACTIVE | COMMUNITY

## 2024-02-28 NOTE — HISTORY OF PRESENT ILLNESS
[FreeTextEntry1] : 62 yo F with coronary artery calcium score 47 and HTN here for follow up.   2/2024 VISIT: bloodwork looks great overall, LDL 72. feels great. no hospitalizations.   8/2023 VISIT:  bp better controlled outside office. +nsaids.  coronary calcification 47  labwork ldl 110, optimized cbc/cmp  2/2023 VISIT: left shoulder pain seeing orthopedics. not taking nsaid. no angina or dyspnea. limiting issue is orthopedic/neurologic. 2+ pulses.   2/2022 VISIT: edema due to amlodipine 10, tolerating 5. 10 y ascvd 2.59%. taking meloxicam 3-5 x a week for back pain. PRIOR:  On last visit, BP was uncontrolled at home however she had incorrect technique of checking it and I switch metoprolol to amlodipine 5. Anxious. started paxil in interim. pending therapist evaluation.   TESTING: excluding above. 6/2022 LABWORK: LDL 82.  Grossly normal CMP/TFT.  10/2021 labwork: . a1c normal. grossly normal cbc/cmp.   Cardiographics 2020 reviewed. unremarkable Carotid, stress test, echo.  Lab work reviewed from April 2021.

## 2024-02-28 NOTE — REASON FOR VISIT
[Symptom and Test Evaluation] : symptom and test evaluation [CV Risk Factors and Non-Cardiac Disease] : CV risk factors and non-cardiac disease [Hypertension] : hypertension [FreeTextEntry3] : Dr. Lauryn Hernandez

## 2024-02-28 NOTE — DISCUSSION/SUMMARY
[FreeTextEntry1] : 64 yo F with coronary artery calcium score 47 and HTN here for follow up.   She has no structural CV abnormality and a nonischemic stress test 2020.   She is amenable to continuing rosuvastatin 5 and CCB for BP. Education given to encouraged her to maintain a low salt diet (<2grams/day). serial labwork.   Follow in 6 months with bloodwork. ER precautions given to patient.

## 2024-03-04 NOTE — HISTORY OF PRESENT ILLNESS
[de-identified] : following up for the right hip and right knee. Patient had CSI for the right knee 3 mos. ago and IA injection into the right hip joint 4 mos. ago/. She states she had great relief with both injections but the hip is bothering her now.

## 2024-03-06 ENCOUNTER — RESULT REVIEW (OUTPATIENT)
Age: 64
End: 2024-03-06

## 2024-03-15 NOTE — REASON FOR VISIT
yes yes yes [Hypertension] : hypertension [FreeTextEntry1] : \par 60 yo F with recently diagnosed HTN here for follow up for BP optimization. No chest pain/dyspnea.  On last visit, BP was uncontrolled at home however she had incorrect technique of checking it and I switch metoprolol to amlodipine 5.\par \par In interim, BP log she kept BP scarcely but averaging 130s. Not taking NSAIDs often. BP here elevated. Anxious. started paxil in interim. pending therapist evaluation.\par \par Cardiographics 2020 reviewed. Carotid, stress test, echo.  Lab work reviewed from April 2021:.\par

## 2024-05-01 ENCOUNTER — APPOINTMENT (OUTPATIENT)
Dept: ORTHOPEDIC SURGERY | Facility: CLINIC | Age: 64
End: 2024-05-01
Payer: COMMERCIAL

## 2024-05-01 VITALS — HEIGHT: 60 IN | WEIGHT: 137 LBS | BODY MASS INDEX: 26.9 KG/M2

## 2024-05-01 DIAGNOSIS — M70.41 PREPATELLAR BURSITIS, RIGHT KNEE: ICD-10-CM

## 2024-05-01 PROCEDURE — 99204 OFFICE O/P NEW MOD 45 MIN: CPT

## 2024-05-01 PROCEDURE — 99214 OFFICE O/P EST MOD 30 MIN: CPT

## 2024-05-01 PROCEDURE — 73562 X-RAY EXAM OF KNEE 3: CPT | Mod: 50

## 2024-05-01 NOTE — PHYSICAL EXAM
[Bilateral] : knee bilaterally [de-identified] : Constitutional: The patient appears well developed, well nourished. Examination of patients ability to communicate functionally was normal.       Neurologic: Coordination is normal. Alert and oriented to time, place and person. No evidence of mood disorder, calm affect.          RIGHT   KNEE  : Inspection of the knee is as follows: MILD  effusion. no ecchymosis, no streaking, no erythema, no atrophy, no deformities of the quad tendon and no deformities of patellar tendon.   MILD PRETIBIAL TUBERCLE SWELLING NOTED    Palpation of the knee is as follows: medial joint line tenderness, and crepitus. no palpable masses and no increased warmth.       Knee Range of Motion is as follows in degrees:        Extension: 0    Flexion: 125        Strength examination of the knee is as follows:    Quadriceps strength is 5/5    Hamstring strength is 5/5       Ligament Stability and Special Test ligamentously stable, negative anterior draw, negative Lachman test, negative posterior draw and no varus or valgus instability.    negative McMurrays test and able to do active straight leg raise without an extensor lag.       Neurological examination of the knee is as follows: light touch is intact throughout.       Gait and function is as follows: mildly antalgic gait.   Constitutional: The patient appears well developed, well nourished. Examination of patients ability to communicate functionally was normal.       Neurologic: Coordination is normal. Alert and oriented to time, place and person. No evidence of mood disorder, calm affect.        LEFT     KNEE  : Inspection of the knee is as follows: moderate effusion. no ecchymosis, no streaking, no erythema, no atrophy, no deformities of the quad tendon and no deformities of patellar tendon.       Palpation of the knee is as follows: medial joint line tenderness, lateral joint line tenderness, medial facet of patella tenderness, lateral facet of patella tenderness and crepitus. no palpable masses and no increased warmth.       Knee Range of Motion is as follows in degrees:        Extension: 0    Flexion: 115        Strength examination of the knee is as follows:    Quadriceps strength is 5/5    Hamstring strength is 5/5       Ligament Stability and Special Test ligamentously stable, negative anterior draw, negative Lachman test, negative posterior draw and no varus or valgus instability.    negative McMurrays test and able to do active straight leg raise without an extensor lag.       Neurological examination of the knee is as follows: light touch is intact throughout.       Gait and function is as follows: mildly antalgic gait.  [FreeTextEntry9] : ap/lat/skiers Left knee show severe medial joint space narrowing.  significant superior patellar spurring RIght knee show mild medial joint space narrowing patellar spurring noted.

## 2024-05-01 NOTE — DISCUSSION/SUMMARY
[de-identified] : Extensive discussion of the options was had with the patient. This discussion included both surgical and nonsurgical options. Options including but not limited to cortisone injection, viscosupplementation, physical therapy, oral anti-inflammatories or MRI were discussed with the patient. We also discussed the option of observation, allowing the patient to continue rest, ice, prescription drug NSAIDs and following up if the condition does not improve. Time was taken to go over any questions the patient had in regard to any of the treatment plans described. She is interested in trying Gelsyn injections. We will submit to the insurance company for Gelsyn injections, f/u once she has been authorized.   The following information has been documented by Coty Owusu acting as a scribe. Dr. Guillen Attestation The documentation recorded by the scribe, in my presence, accurately reflects the service I, Dr. Guillen, personally performed, and the decisions made by me with my edits as appropriate.

## 2024-05-01 NOTE — HISTORY OF PRESENT ILLNESS
[de-identified] :  Patient c/o b/l knee pain Left greater than RIght.  Patient states lately the Left knee has been painful. She notes swelling of the LEft knee joint and pain anteromedial.  She states the RIght knee is still swollen and she notes pain when kneeling and she notes pain anteromedial.  She had Right hip CSI IA in March at .  which she states gave her good relief.

## 2024-05-15 ENCOUNTER — APPOINTMENT (OUTPATIENT)
Dept: ORTHOPEDIC SURGERY | Facility: CLINIC | Age: 64
End: 2024-05-15
Payer: COMMERCIAL

## 2024-05-15 VITALS — BODY MASS INDEX: 26.7 KG/M2 | WEIGHT: 136 LBS | HEIGHT: 60 IN

## 2024-05-15 DIAGNOSIS — M17.11 UNILATERAL PRIMARY OSTEOARTHRITIS, RIGHT KNEE: ICD-10-CM

## 2024-05-15 DIAGNOSIS — M17.12 UNILATERAL PRIMARY OSTEOARTHRITIS, LEFT KNEE: ICD-10-CM

## 2024-05-15 PROCEDURE — 99214 OFFICE O/P EST MOD 30 MIN: CPT | Mod: 25

## 2024-05-15 PROCEDURE — 20610 DRAIN/INJ JOINT/BURSA W/O US: CPT | Mod: 50

## 2024-05-15 NOTE — PROCEDURE
[Large Joint Injection] : Large joint injection [Bilateral] : bilaterally of the [Knee] : knee [Pain] : pain [Inflammation] : inflammation [Betadine] : betadine [Sterile technique used] : sterile technique used [___ cc    6mg] :  Betamethasone (Celestone) ~Vcc of 6mg [___ cc    1%] : Lidocaine ~Vcc of 1%  [] : Patient tolerated procedure well [Call if redness, pain or fever occur] : call if redness, pain or fever occur [Apply ice for 15min out of every hour for the next 12-24 hours as tolerated] : apply ice for 15 minutes out of every hour for the next 12-24 hours as tolerated [Previous OTC use and PT nontherapeutic] : patient has tried OTC's including aspirin, Ibuprofen, Aleve, etc or prescription NSAIDS, and/or exercises at home and/or physical therapy without satisfactory response [Patient had decreased mobility in the joint] : patient had decreased mobility in the joint [Risks, benefits, alternatives discussed / Verbal consent obtained] : the risks benefits, and alternatives have been discussed, and verbal consent was obtained

## 2024-05-30 NOTE — ED ADULT NURSE NOTE - OBJECTIVE STATEMENT
L arm pressure and numbness and tingling for weeks, hx  of anxiety. stated," I have lot of stress lately"
98.3

## 2024-06-18 NOTE — ADDENDUM
[FreeTextEntry1] : ADDENDUM:  Patient called today stating the CSI for the left knee did not provide any relief. She states she has been taking NSAIDS without relief. Her ins. denied the gel injections and we discussed what other options she has at this time. She was given a HEP for SLR quad/hamstring strengthening a few weeks ago and states the HEP did not provide any relief. She states the left knee pain is greater than the right. She would like to proceed with an MRI at this time. Plan is for MRI to r/o MMT.

## 2024-06-18 NOTE — HISTORY OF PRESENT ILLNESS
[de-identified] : Follow up bilateral knees.  Patient states the lubricant injections were not Insurance approved. She states both knees are equally painful and she cant kneel on them at all.  Feels like bruises int the knees

## 2024-06-18 NOTE — DISCUSSION/SUMMARY
[de-identified] : Extensive discussion of the options was had with the patient. This discussion included both surgical and nonsurgical options. Options including but not limited to cortisone injection, viscosupplementation, physical therapy, oral anti-inflammatories were discussed with the patient. We also discussed the option of observation, allowing the patient to continue rest, ice, prescription drug NSAIDs and following up if the condition does not improve. Time was taken to go over any questions the patient had in regard to any of the treatment plans described. Plan is for CSI bilaterally. She will f/u in 1 mos.

## 2024-06-18 NOTE — PHYSICAL EXAM
[de-identified] : Constitutional: The patient appears well developed, well nourished. Examination of patients ability to communicate functionally was normal.       Neurologic: Coordination is normal. Alert and oriented to time, place and person. No evidence of mood disorder, calm affect.          RIGHT   KNEE  : Inspection of the knee is as follows: MILD  effusion. no ecchymosis, no streaking, no erythema, no atrophy, no deformities of the quad tendon and no deformities of patellar tendon.   MILD PRETIBIAL TUBERCLE SWELLING NOTED    Palpation of the knee is as follows: medial joint line tenderness, and crepitus. no palpable masses and no increased warmth.       Knee Range of Motion is as follows in degrees:        Extension: 0    Flexion: 125        Strength examination of the knee is as follows:    Quadriceps strength is 5/5    Hamstring strength is 5/5       Ligament Stability and Special Test ligamentously stable, negative anterior draw, negative Lachman test, negative posterior draw and no varus or valgus instability.    negative McMurrays test and able to do active straight leg raise without an extensor lag.       Neurological examination of the knee is as follows: light touch is intact throughout.       Gait and function is as follows: mildly antalgic gait.   Constitutional: The patient appears well developed, well nourished. Examination of patients ability to communicate functionally was normal.       Neurologic: Coordination is normal. Alert and oriented to time, place and person. No evidence of mood disorder, calm affect.        LEFT     KNEE  : Inspection of the knee is as follows: moderate effusion. no ecchymosis, no streaking, no erythema, no atrophy, no deformities of the quad tendon and no deformities of patellar tendon.       Palpation of the knee is as follows: medial joint line tenderness, lateral joint line tenderness, medial facet of patella tenderness, lateral facet of patella tenderness and crepitus. no palpable masses and no increased warmth.       Knee Range of Motion is as follows in degrees:        Extension: 0    Flexion: 115        Strength examination of the knee is as follows:    Quadriceps strength is 5/5    Hamstring strength is 5/5       Ligament Stability and Special Test ligamentously stable, negative anterior draw, negative Lachman test, negative posterior draw and no varus or valgus instability.    negative McMurrays test and able to do active straight leg raise without an extensor lag.       Neurological examination of the knee is as follows: light touch is intact throughout.       Gait and function is as follows: mildly antalgic gait.

## 2024-06-21 ENCOUNTER — RESULT REVIEW (OUTPATIENT)
Age: 64
End: 2024-06-21

## 2024-06-24 ENCOUNTER — RESULT REVIEW (OUTPATIENT)
Age: 64
End: 2024-06-24

## 2024-08-01 ENCOUNTER — APPOINTMENT (OUTPATIENT)
Dept: ORTHOPEDIC SURGERY | Facility: CLINIC | Age: 64
End: 2024-08-01

## 2024-08-01 DIAGNOSIS — M17.12 UNILATERAL PRIMARY OSTEOARTHRITIS, LEFT KNEE: ICD-10-CM

## 2024-08-01 DIAGNOSIS — M25.561 PAIN IN RIGHT KNEE: ICD-10-CM

## 2024-08-01 PROCEDURE — 99214 OFFICE O/P EST MOD 30 MIN: CPT

## 2024-08-01 RX ORDER — MELOXICAM 15 MG/1
15 TABLET ORAL
Qty: 30 | Refills: 0 | Status: ACTIVE | COMMUNITY
Start: 2024-08-01 | End: 1900-01-01

## 2024-08-01 NOTE — HISTORY OF PRESENT ILLNESS
[de-identified] : Follow up on right knee pain. Patient also twisted her left knee a few weeks ago and has MRI at  which she had taken in Nov '23.  She notes pain medial and lateral aspect of the knee.  She has difficulty ambulating.  She tried an ace wrap without any relief.

## 2024-08-01 NOTE — PHYSICAL EXAM
[de-identified] : Constitutional: The patient appears well developed, well nourished. Examination of patients ability to communicate functionally was normal.       Neurologic: Coordination is normal. Alert and oriented to time, place and person. No evidence of mood disorder, calm affect.          RIGHT   KNEE  : Inspection of the knee is as follows: MILD  effusion. no ecchymosis, no streaking, no erythema, no atrophy, no deformities of the quad tendon and no deformities of patellar tendon.   MILD PRETIBIAL TUBERCLE SWELLING NOTED improved since last visit MILD SUPERIOR SOFT TISSUE SWELLING JUST SUPERIOR TO THE PATELLA ANTERIORLY    Palpation of the knee is as follows: medial joint line tenderness and lateral joint line TTP, and crepitus. no palpable masses and no increased warmth.       Knee Range of Motion is as follows in degrees:        Extension: 0    Flexion: 125        Strength examination of the knee is as follows:    Quadriceps strength is 5/5    Hamstring strength is 5/5       Ligament Stability and Special Test ligamentously stable, negative anterior draw, negative Lachman test, negative posterior draw and no varus or valgus instability.    POSITIVE McMurrays test and able to do active straight leg raise without an extensor lag.       Neurological examination of the knee is as follows: light touch is intact throughout.       Gait and function is as follows: mildly antalgic gait.   Constitutional: The patient appears well developed, well nourished. Examination of patients ability to communicate functionally was normal.       Neurologic: Coordination is normal. Alert and oriented to time, place and person. No evidence of mood disorder, calm affect.        LEFT     KNEE  : Inspection of the knee is as follows: moderate effusion. no ecchymosis, no streaking, no erythema, no atrophy, no deformities of the quad tendon and no deformities of patellar tendon.       Palpation of the knee is as follows: medial joint line tenderness, lateral joint line tenderness, medial facet of patella tenderness, lateral facet of patella tenderness and crepitus. no palpable masses and no increased warmth.       Knee Range of Motion is as follows in degrees:        Extension: 0    Flexion: 115        Strength examination of the knee is as follows:    Quadriceps strength is 5/5    Hamstring strength is 5/5       Ligament Stability and Special Test ligamentously stable, negative anterior draw, negative Lachman test, negative posterior draw and no varus or valgus instability.    negative McMurrays test and able to do active straight leg raise without an extensor lag.       Neurological examination of the knee is as follows: light touch is intact throughout.       Gait and function is as follows: mildly antalgic gait.

## 2024-08-01 NOTE — DISCUSSION/SUMMARY
[de-identified] : Patient was given Rx for MRI of the right knee  to further evaluate for any ligamentous, muscle and cartilaginous injury that is suspected due to physical examination and patients description of pain, clicking, and feelings of instability and/or weakness. Patient was instructed to f/u after imaging for review.  After discussion of options, Patient was provided with a prescription for Meloxicam 15mg. Time was taken to discuss the importance of proper prescription drug management. They were instructed to take this twice daily for two weeks. The patient was also warned of potential risks/side effects of the medication. These potential risks include bruising, gastrointestinal bleed, gastrointestinal burning, allergic reaction and reduced blood clotting. The patient expressed verbal understanding. I recommend that the patient follow-up with their medical physician to discuss any significant specific potential issues with long term use such as interactions with current medications or with exacerbation of underlying medical morbidities.

## 2024-08-14 ENCOUNTER — RESULT REVIEW (OUTPATIENT)
Age: 64
End: 2024-08-14

## 2024-09-09 ENCOUNTER — RESULT REVIEW (OUTPATIENT)
Age: 64
End: 2024-09-09

## 2024-09-13 ENCOUNTER — NON-APPOINTMENT (OUTPATIENT)
Age: 64
End: 2024-09-13

## 2024-09-16 ENCOUNTER — APPOINTMENT (OUTPATIENT)
Dept: ORTHOPEDIC SURGERY | Facility: CLINIC | Age: 64
End: 2024-09-16
Payer: COMMERCIAL

## 2024-09-16 VITALS — HEIGHT: 60 IN | WEIGHT: 134 LBS | BODY MASS INDEX: 26.31 KG/M2

## 2024-09-16 DIAGNOSIS — M17.11 UNILATERAL PRIMARY OSTEOARTHRITIS, RIGHT KNEE: ICD-10-CM

## 2024-09-16 DIAGNOSIS — S82.121A DISPLACED FRACTURE OF LATERAL CONDYLE OF RIGHT TIBIA, INITIAL ENCOUNTER FOR CLOSED FRACTURE: ICD-10-CM

## 2024-09-16 PROCEDURE — 99214 OFFICE O/P EST MOD 30 MIN: CPT

## 2024-09-16 NOTE — HISTORY OF PRESENT ILLNESS
[de-identified] : Patient is here for a follow up on right knee. Patient is here to review MRI results. Patient states the left knee is more painful than the Right. She statez the hips are painful however better with CSI IA.  Her Right knee is painful mostly medial and less so laterally.

## 2024-09-16 NOTE — PHYSICAL EXAM
[de-identified] : Constitutional: The patient appears well developed, well nourished. Examination of patients ability to communicate functionally was normal.       Neurologic: Coordination is normal. Alert and oriented to time, place and person. No evidence of mood disorder, calm affect.          RIGHT   KNEE  : Inspection of the knee is as follows: MILD  effusion. no ecchymosis, no streaking, no erythema, no atrophy, no deformities of the quad tendon and no deformities of patellar tendon.   MILD PRETIBIAL TUBERCLE SWELLING NOTED improved since last visit MILD SUPERIOR SOFT TISSUE SWELLING JUST SUPERIOR TO THE PATELLA ANTERIORLY    Palpation of the knee is as follows: medial joint line tenderness and lateral joint line TTP, and crepitus. no palpable masses and no increased warmth.       Knee Range of Motion is as follows in degrees:        Extension: 0    Flexion: 125        Strength examination of the knee is as follows:    Quadriceps strength is 5/5    Hamstring strength is 5/5       Ligament Stability and Special Test ligamentously stable, negative anterior draw, negative Lachman test, negative posterior draw and no varus or valgus instability.    POSITIVE McMurrays test and able to do active straight leg raise without an extensor lag.       Neurological examination of the knee is as follows: light touch is intact throughout.       Gait and function is as follows: mildly antalgic gait.   Constitutional: The patient appears well developed, well nourished. Examination of patients ability to communicate functionally was normal.       Neurologic: Coordination is normal. Alert and oriented to time, place and person. No evidence of mood disorder, calm affect.        LEFT     KNEE  : Inspection of the knee is as follows: moderate effusion. no ecchymosis, no streaking, no erythema, no atrophy, no deformities of the quad tendon and no deformities of patellar tendon.       Palpation of the knee is as follows: medial joint line tenderness, lateral joint line tenderness, medial facet of patella tenderness, lateral facet of patella tenderness and crepitus. no palpable masses and no increased warmth.       Knee Range of Motion is as follows in degrees:        Extension: 0    Flexion: 115        Strength examination of the knee is as follows:    Quadriceps strength is 5/5    Hamstring strength is 5/5       Ligament Stability and Special Test ligamentously stable, negative anterior draw, negative Lachman test, negative posterior draw and no varus or valgus instability.    negative McMurrays test and able to do active straight leg raise without an extensor lag.       Neurological examination of the knee is as follows: light touch is intact throughout.       Gait and function is as follows: mildly antalgic gait.

## 2024-09-16 NOTE — REASON FOR VISIT
[FreeTextEntry2] : IMPRESSION:   1. Curvilinear fracture line, at least trabecular, in the weightbearing aspect of the lateral tibial plateau, new when compared to prior MRI. 2. Small radial like tear along the anterior margin of the mesial posterior horn of the medial meniscus, at the meniscal root, unchanged since prior study. 3. 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 4. Small joint effusion, slightly larger than on prior study. Please see above for additional details

## 2024-09-16 NOTE — DISCUSSION/SUMMARY
[de-identified] : Extensive discussion of the options was had with the patient. This discussion included both surgical and nonsurgical options. Options including but not limited to cortisone injection, visco-supplementation, physical therapy, prescription oral anti-inflammatories, MRI and arthroplasty VS arthroscopy were discussed with the patient. We also discussed the option of observation, allowing the patient to continue rest, ice and following up if the condition does not improve. Time was taken to go over any questions the patient had in regard to any of the treatment plans described.   I have personally and independently reviewed all images, records and imaging reports. The images, findings and impressions were thoroughly discussed and reviewed with the patient as well. All questions have been answered and the patient is in agreement with the plan proceeding.  At this time the plan that the patient wishes to move forward with is to observe and continue self care including but not limited to HEP, Ice, NSAIDs and rest as needed. Patient was instructed to f/u if their symptoms do not improve or if there are any further concerns..  Entered by Cheo Goldstein acting as scribe. Dr. Guillen Attestation The documentation recorded by the scribe, in my presence, accurately reflects the service I, Dr. Guillen, personally performed, and the decisions made by me with my edits as appropriate.

## 2024-09-30 ENCOUNTER — NON-APPOINTMENT (OUTPATIENT)
Age: 64
End: 2024-09-30

## 2024-10-01 ENCOUNTER — APPOINTMENT (OUTPATIENT)
Dept: CARDIOLOGY | Facility: CLINIC | Age: 64
End: 2024-10-01
Payer: COMMERCIAL

## 2024-10-01 VITALS
DIASTOLIC BLOOD PRESSURE: 74 MMHG | OXYGEN SATURATION: 97 % | WEIGHT: 142 LBS | SYSTOLIC BLOOD PRESSURE: 132 MMHG | HEART RATE: 66 BPM | BODY MASS INDEX: 27.73 KG/M2

## 2024-10-01 DIAGNOSIS — I10 ESSENTIAL (PRIMARY) HYPERTENSION: ICD-10-CM

## 2024-10-01 DIAGNOSIS — I25.10 ATHEROSCLEROTIC HEART DISEASE OF NATIVE CORONARY ARTERY W/OUT ANGINA PECTORIS: ICD-10-CM

## 2024-10-01 DIAGNOSIS — R01.1 CARDIAC MURMUR, UNSPECIFIED: ICD-10-CM

## 2024-10-01 DIAGNOSIS — Z87.891 PERSONAL HISTORY OF NICOTINE DEPENDENCE: ICD-10-CM

## 2024-10-01 PROCEDURE — 99214 OFFICE O/P EST MOD 30 MIN: CPT

## 2024-10-01 RX ORDER — DICYCLOMINE HYDROCHLORIDE 10 MG/1
10 CAPSULE ORAL
Refills: 0 | Status: ACTIVE | COMMUNITY

## 2024-10-01 NOTE — CARDIOLOGY SUMMARY
[de-identified] : 6/25/21 SB NSST [de-identified] : 5/2020 NST [place], 10 min, negative for ischemia [de-identified] : 7/2020 Mild aortic valve sclerosis with out stenosis, mod MR, mild TR  [de-identified] : Non-obstructive calcified plaque

## 2024-10-01 NOTE — CARDIOLOGY SUMMARY
[de-identified] : 6/25/21 SB NSST [de-identified] : 5/2020 NST [place], 10 min, negative for ischemia [de-identified] : 7/2020 Mild aortic valve sclerosis with out stenosis, mod MR, mild TR  [de-identified] : Non-obstructive calcified plaque

## 2024-10-01 NOTE — HISTORY OF PRESENT ILLNESS
[FreeTextEntry1] : 65 yo F with coronary artery calcium score 47 and HTN here for follow up.   10/2024 VISIT: feels well except osteoarthritis. no angina.   2/2024 VISIT: bloodwork looks great overall, LDL 72. feels great. no hospitalizations.   8/2023 VISIT:  bp better controlled outside office. +nsaids.  coronary calcification 47  labwork ldl 110, optimized cbc/cmp  2/2023 VISIT: left shoulder pain seeing orthopedics. not taking nsaid. no angina or dyspnea. limiting issue is orthopedic/neurologic. 2+ pulses.   2/2022 VISIT: edema due to amlodipine 10, tolerating 5. 10 y ascvd 2.59%. taking meloxicam 3-5 x a week for back pain. PRIOR:  On last visit, BP was uncontrolled at home however she had incorrect technique of checking it and I switch metoprolol to amlodipine 5. Anxious. started paxil in interim. pending therapist evaluation.  ** TESTING I REVIEWED TODAY excluding above:  6/2022 LABWORK: LDL 82.  Grossly normal CMP/TFT.  10/2021 labwork: . a1c normal. grossly normal cbc/cmp.   Cardiographics 2020 reviewed. unremarkable Carotid, stress test, echo.  Lab work reviewed from April 2021.

## 2024-10-01 NOTE — DISCUSSION/SUMMARY
[FreeTextEntry1] : 65 yo F with coronary artery calcium score 47, preDM2, and HTN here for follow up.    She has no structural CV abnormality and a non-ischemic stress test 2020.   We will update the testing, echocardiogram, carotid doppler, and treadmill testing.   She is amenable to continuing statin and bp control. If average SBP>130, will increase amlodipine to 10 mg based on her log. Education given to encouraged her to maintain a low salt diet (<2grams/day).  Follow after testing. ER precautions given to patient.

## 2024-10-23 ENCOUNTER — APPOINTMENT (OUTPATIENT)
Dept: ORTHOPEDIC SURGERY | Facility: CLINIC | Age: 64
End: 2024-10-23
Payer: COMMERCIAL

## 2024-10-23 DIAGNOSIS — M16.10 UNILATERAL PRIMARY OSTEOARTHRITIS, UNSPECIFIED HIP: ICD-10-CM

## 2024-10-23 DIAGNOSIS — M16.11 UNILATERAL PRIMARY OSTEOARTHRITIS, RIGHT HIP: ICD-10-CM

## 2024-10-23 DIAGNOSIS — M16.12 UNILATERAL PRIMARY OSTEOARTHRITIS, LEFT HIP: ICD-10-CM

## 2024-10-23 PROCEDURE — 99214 OFFICE O/P EST MOD 30 MIN: CPT

## 2024-10-30 ENCOUNTER — RESULT REVIEW (OUTPATIENT)
Age: 64
End: 2024-10-30

## 2024-11-09 ENCOUNTER — NON-APPOINTMENT (OUTPATIENT)
Age: 64
End: 2024-11-09

## 2024-11-13 ENCOUNTER — APPOINTMENT (OUTPATIENT)
Dept: ORTHOPEDIC SURGERY | Facility: CLINIC | Age: 64
End: 2024-11-13

## 2024-11-15 ENCOUNTER — RESULT REVIEW (OUTPATIENT)
Age: 64
End: 2024-11-15

## 2024-11-25 ENCOUNTER — APPOINTMENT (OUTPATIENT)
Dept: CARDIOLOGY | Facility: CLINIC | Age: 64
End: 2024-11-25
Payer: COMMERCIAL

## 2024-11-25 DIAGNOSIS — I10 ESSENTIAL (PRIMARY) HYPERTENSION: ICD-10-CM

## 2024-11-25 DIAGNOSIS — I25.10 ATHEROSCLEROTIC HEART DISEASE OF NATIVE CORONARY ARTERY W/OUT ANGINA PECTORIS: ICD-10-CM

## 2024-11-25 PROCEDURE — 93880 EXTRACRANIAL BILAT STUDY: CPT

## 2024-11-25 PROCEDURE — 93306 TTE W/DOPPLER COMPLETE: CPT

## 2024-11-25 PROCEDURE — 93015 CV STRESS TEST SUPVJ I&R: CPT

## 2024-11-29 ENCOUNTER — NON-APPOINTMENT (OUTPATIENT)
Age: 64
End: 2024-11-29

## 2024-12-04 ENCOUNTER — APPOINTMENT (OUTPATIENT)
Dept: ORTHOPEDIC SURGERY | Facility: CLINIC | Age: 64
End: 2024-12-04
Payer: COMMERCIAL

## 2024-12-04 DIAGNOSIS — S83.241D OTHER TEAR OF MEDIAL MENISCUS, CURRENT INJURY, RIGHT KNEE, SUBSEQUENT ENCOUNTER: ICD-10-CM

## 2024-12-04 DIAGNOSIS — S83.242D OTHER TEAR OF MEDIAL MENISCUS, CURRENT INJURY, LEFT KNEE, SUBSEQUENT ENCOUNTER: ICD-10-CM

## 2024-12-04 DIAGNOSIS — M25.561 PAIN IN RIGHT KNEE: ICD-10-CM

## 2024-12-04 PROCEDURE — 99214 OFFICE O/P EST MOD 30 MIN: CPT

## 2024-12-10 ENCOUNTER — APPOINTMENT (OUTPATIENT)
Dept: CARDIOLOGY | Facility: CLINIC | Age: 64
End: 2024-12-10
Payer: COMMERCIAL

## 2024-12-10 VITALS
WEIGHT: 142 LBS | SYSTOLIC BLOOD PRESSURE: 124 MMHG | DIASTOLIC BLOOD PRESSURE: 62 MMHG | OXYGEN SATURATION: 98 % | BODY MASS INDEX: 27.73 KG/M2 | HEART RATE: 75 BPM

## 2024-12-10 DIAGNOSIS — R01.1 CARDIAC MURMUR, UNSPECIFIED: ICD-10-CM

## 2024-12-10 DIAGNOSIS — I10 ESSENTIAL (PRIMARY) HYPERTENSION: ICD-10-CM

## 2024-12-10 DIAGNOSIS — Z87.891 PERSONAL HISTORY OF NICOTINE DEPENDENCE: ICD-10-CM

## 2024-12-10 DIAGNOSIS — I25.10 ATHEROSCLEROTIC HEART DISEASE OF NATIVE CORONARY ARTERY W/OUT ANGINA PECTORIS: ICD-10-CM

## 2024-12-10 PROCEDURE — 99214 OFFICE O/P EST MOD 30 MIN: CPT

## 2024-12-19 ENCOUNTER — NON-APPOINTMENT (OUTPATIENT)
Age: 64
End: 2024-12-19

## 2025-02-03 ENCOUNTER — NON-APPOINTMENT (OUTPATIENT)
Age: 65
End: 2025-02-03

## 2025-02-06 RX ORDER — HYDROCODONE BITARTRATE AND ACETAMINOPHEN 5; 325 MG/1; MG/1
5-325 TABLET ORAL
Qty: 35 | Refills: 0 | Status: ACTIVE | COMMUNITY
Start: 2025-02-06 | End: 1900-01-01

## 2025-02-07 ENCOUNTER — APPOINTMENT (OUTPATIENT)
Dept: ORTHOPEDIC SURGERY | Facility: AMBULATORY SURGERY CENTER | Age: 65
End: 2025-02-07

## 2025-02-07 PROCEDURE — 29877 ARTHRS KNEE SURG DBRDMT/SHVG: CPT | Mod: AS,59,RT

## 2025-02-07 PROCEDURE — 29877 ARTHRS KNEE SURG DBRDMT/SHVG: CPT | Mod: 59,RT

## 2025-02-07 PROCEDURE — 29879 ARTHRS KNE SRG ABRASJ ARTHRP: CPT | Mod: 59,RT

## 2025-02-07 PROCEDURE — 29881 ARTHRS KNE SRG MNISECTMY M/L: CPT | Mod: LT

## 2025-02-07 PROCEDURE — 29881 ARTHRS KNE SRG MNISECTMY M/L: CPT | Mod: AS,LT

## 2025-02-21 ENCOUNTER — APPOINTMENT (OUTPATIENT)
Dept: ORTHOPEDIC SURGERY | Facility: CLINIC | Age: 65
End: 2025-02-21
Payer: COMMERCIAL

## 2025-02-21 DIAGNOSIS — S83.242D OTHER TEAR OF MEDIAL MENISCUS, CURRENT INJURY, LEFT KNEE, SUBSEQUENT ENCOUNTER: ICD-10-CM

## 2025-02-21 PROCEDURE — 99024 POSTOP FOLLOW-UP VISIT: CPT

## 2025-03-10 ENCOUNTER — APPOINTMENT (OUTPATIENT)
Dept: ORTHOPEDIC SURGERY | Facility: CLINIC | Age: 65
End: 2025-03-10
Payer: COMMERCIAL

## 2025-03-10 DIAGNOSIS — M16.12 UNILATERAL PRIMARY OSTEOARTHRITIS, LEFT HIP: ICD-10-CM

## 2025-03-10 DIAGNOSIS — M17.12 UNILATERAL PRIMARY OSTEOARTHRITIS, LEFT KNEE: ICD-10-CM

## 2025-03-10 DIAGNOSIS — M25.561 PAIN IN RIGHT KNEE: ICD-10-CM

## 2025-03-10 PROCEDURE — 99024 POSTOP FOLLOW-UP VISIT: CPT

## 2025-04-01 ENCOUNTER — APPOINTMENT (OUTPATIENT)
Dept: ORTHOPEDIC SURGERY | Facility: CLINIC | Age: 65
End: 2025-04-01

## 2025-04-19 ENCOUNTER — NON-APPOINTMENT (OUTPATIENT)
Age: 65
End: 2025-04-19

## 2025-04-26 ENCOUNTER — NON-APPOINTMENT (OUTPATIENT)
Age: 65
End: 2025-04-26

## 2025-05-01 RX ORDER — FAMOTIDINE 20 MG/1
20 TABLET, FILM COATED ORAL
Qty: 60 | Refills: 0 | Status: ACTIVE | COMMUNITY
Start: 2025-05-01 | End: 1900-01-01

## 2025-05-01 RX ORDER — OXYCODONE 5 MG/1
5 TABLET ORAL
Qty: 40 | Refills: 0 | Status: ACTIVE | COMMUNITY
Start: 2025-05-01 | End: 1900-01-01

## 2025-05-01 RX ORDER — CELECOXIB 200 MG/1
200 CAPSULE ORAL TWICE DAILY
Qty: 30 | Refills: 0 | Status: ACTIVE | COMMUNITY
Start: 2025-05-01 | End: 1900-01-01

## 2025-05-01 RX ORDER — CEPHALEXIN 500 MG/1
500 CAPSULE ORAL EVERY 6 HOURS
Qty: 4 | Refills: 0 | Status: ACTIVE | COMMUNITY
Start: 2025-05-01 | End: 1900-01-01

## 2025-05-01 RX ORDER — KRILL/OM-3/DHA/EPA/PHOSPHO/AST 1000-230MG
81 CAPSULE ORAL
Qty: 60 | Refills: 0 | Status: ACTIVE | COMMUNITY
Start: 2025-05-01 | End: 1900-01-01

## 2025-05-02 ENCOUNTER — APPOINTMENT (OUTPATIENT)
Dept: ORTHOPEDIC SURGERY | Facility: AMBULATORY SURGERY CENTER | Age: 65
End: 2025-05-02
Payer: COMMERCIAL

## 2025-05-02 PROCEDURE — 27130 TOTAL HIP ARTHROPLASTY: CPT | Mod: 79,RT

## 2025-05-02 PROCEDURE — 20610 DRAIN/INJ JOINT/BURSA W/O US: CPT | Mod: AS,79,RT

## 2025-05-02 PROCEDURE — 20610 DRAIN/INJ JOINT/BURSA W/O US: CPT | Mod: 79,RT

## 2025-05-02 PROCEDURE — 27130 TOTAL HIP ARTHROPLASTY: CPT | Mod: AS,79,RT

## 2025-05-12 ENCOUNTER — NON-APPOINTMENT (OUTPATIENT)
Age: 65
End: 2025-05-12

## 2025-05-14 ENCOUNTER — APPOINTMENT (OUTPATIENT)
Dept: ORTHOPEDIC SURGERY | Facility: CLINIC | Age: 65
End: 2025-05-14
Payer: COMMERCIAL

## 2025-05-14 DIAGNOSIS — M16.12 UNILATERAL PRIMARY OSTEOARTHRITIS, LEFT HIP: ICD-10-CM

## 2025-05-14 PROCEDURE — 99024 POSTOP FOLLOW-UP VISIT: CPT

## 2025-05-14 PROCEDURE — 73502 X-RAY EXAM HIP UNI 2-3 VIEWS: CPT

## 2025-05-14 PROCEDURE — 99214 OFFICE O/P EST MOD 30 MIN: CPT | Mod: 24

## 2025-05-22 ENCOUNTER — NON-APPOINTMENT (OUTPATIENT)
Age: 65
End: 2025-05-22

## 2025-05-29 ENCOUNTER — NON-APPOINTMENT (OUTPATIENT)
Age: 65
End: 2025-05-29

## 2025-06-02 ENCOUNTER — APPOINTMENT (OUTPATIENT)
Dept: CARDIOLOGY | Facility: CLINIC | Age: 65
End: 2025-06-02

## 2025-06-03 ENCOUNTER — NON-APPOINTMENT (OUTPATIENT)
Age: 65
End: 2025-06-03

## 2025-06-06 VITALS — HEIGHT: 60 IN | BODY MASS INDEX: 27.88 KG/M2 | WEIGHT: 142 LBS

## 2025-06-07 ENCOUNTER — NON-APPOINTMENT (OUTPATIENT)
Age: 65
End: 2025-06-07

## 2025-06-30 ENCOUNTER — APPOINTMENT (OUTPATIENT)
Dept: CARDIOLOGY | Facility: CLINIC | Age: 65
End: 2025-06-30
Payer: COMMERCIAL

## 2025-06-30 VITALS
HEART RATE: 65 BPM | DIASTOLIC BLOOD PRESSURE: 68 MMHG | HEIGHT: 60 IN | SYSTOLIC BLOOD PRESSURE: 122 MMHG | WEIGHT: 141 LBS | BODY MASS INDEX: 27.68 KG/M2 | OXYGEN SATURATION: 100 %

## 2025-06-30 PROBLEM — Z01.810 PRE-OPERATIVE CARDIOVASCULAR EXAMINATION: Status: ACTIVE | Noted: 2025-06-30

## 2025-06-30 PROCEDURE — 93000 ELECTROCARDIOGRAM COMPLETE: CPT | Mod: NC

## 2025-06-30 PROCEDURE — 99204 OFFICE O/P NEW MOD 45 MIN: CPT

## 2025-06-30 PROCEDURE — 99214 OFFICE O/P EST MOD 30 MIN: CPT

## 2025-07-07 ENCOUNTER — NON-APPOINTMENT (OUTPATIENT)
Age: 65
End: 2025-07-07

## 2025-07-16 ENCOUNTER — NON-APPOINTMENT (OUTPATIENT)
Age: 65
End: 2025-07-16

## 2025-07-17 ENCOUNTER — NON-APPOINTMENT (OUTPATIENT)
Age: 65
End: 2025-07-17

## 2025-07-17 RX ORDER — KRILL/OM-3/DHA/EPA/PHOSPHO/AST 1000-230MG
81 CAPSULE ORAL
Qty: 60 | Refills: 0 | Status: ACTIVE | COMMUNITY
Start: 2025-07-17 | End: 1900-01-01

## 2025-07-17 RX ORDER — CEPHALEXIN 500 MG/1
500 CAPSULE ORAL EVERY 6 HOURS
Qty: 4 | Refills: 0 | Status: ACTIVE | COMMUNITY
Start: 2025-07-17 | End: 1900-01-01

## 2025-07-17 RX ORDER — FAMOTIDINE 20 MG/1
20 TABLET, FILM COATED ORAL
Qty: 60 | Refills: 0 | Status: ACTIVE | COMMUNITY
Start: 2025-07-17 | End: 1900-01-01

## 2025-07-17 RX ORDER — CELECOXIB 200 MG/1
200 CAPSULE ORAL TWICE DAILY
Qty: 30 | Refills: 0 | Status: ACTIVE | COMMUNITY
Start: 2025-07-17 | End: 1900-01-01

## 2025-07-17 RX ORDER — OXYCODONE 5 MG/1
5 TABLET ORAL
Qty: 40 | Refills: 0 | Status: ACTIVE | COMMUNITY
Start: 2025-07-17 | End: 1900-01-01

## 2025-07-18 ENCOUNTER — APPOINTMENT (OUTPATIENT)
Dept: ORTHOPEDIC SURGERY | Facility: AMBULATORY SURGERY CENTER | Age: 65
End: 2025-07-18

## 2025-07-18 ENCOUNTER — RX RENEWAL (OUTPATIENT)
Age: 65
End: 2025-07-18

## 2025-07-18 PROCEDURE — 27130 TOTAL HIP ARTHROPLASTY: CPT | Mod: 79,LT

## 2025-07-18 PROCEDURE — 27130 TOTAL HIP ARTHROPLASTY: CPT | Mod: AS,79,LT

## 2025-07-30 ENCOUNTER — APPOINTMENT (OUTPATIENT)
Dept: ORTHOPEDIC SURGERY | Facility: CLINIC | Age: 65
End: 2025-07-30
Payer: COMMERCIAL

## 2025-07-30 DIAGNOSIS — Z96.652 PRESENCE OF LEFT ARTIFICIAL KNEE JOINT: ICD-10-CM

## 2025-07-30 DIAGNOSIS — M16.11 UNILATERAL PRIMARY OSTEOARTHRITIS, RIGHT HIP: ICD-10-CM

## 2025-07-30 DIAGNOSIS — M16.12 UNILATERAL PRIMARY OSTEOARTHRITIS, LEFT HIP: ICD-10-CM

## 2025-07-30 PROCEDURE — 99024 POSTOP FOLLOW-UP VISIT: CPT

## 2025-07-30 PROCEDURE — 73502 X-RAY EXAM HIP UNI 2-3 VIEWS: CPT

## 2025-08-02 ENCOUNTER — NON-APPOINTMENT (OUTPATIENT)
Age: 65
End: 2025-08-02

## 2025-08-04 RX ORDER — CYCLOBENZAPRINE HYDROCHLORIDE 5 MG/1
5 TABLET, FILM COATED ORAL 3 TIMES DAILY
Qty: 25 | Refills: 0 | Status: ACTIVE | COMMUNITY
Start: 2025-08-04 | End: 1900-01-01

## 2025-08-27 ENCOUNTER — NON-APPOINTMENT (OUTPATIENT)
Age: 65
End: 2025-08-27

## 2025-09-10 ENCOUNTER — APPOINTMENT (OUTPATIENT)
Dept: ORTHOPEDIC SURGERY | Facility: CLINIC | Age: 65
End: 2025-09-10

## 2025-09-10 DIAGNOSIS — Z96.641 PRESENCE OF RIGHT ARTIFICIAL HIP JOINT: ICD-10-CM

## 2025-09-10 DIAGNOSIS — M17.12 UNILATERAL PRIMARY OSTEOARTHRITIS, LEFT KNEE: ICD-10-CM

## 2025-09-10 DIAGNOSIS — Z96.642 PRESENCE OF LEFT ARTIFICIAL HIP JOINT: ICD-10-CM

## 2025-09-10 PROCEDURE — 20610 DRAIN/INJ JOINT/BURSA W/O US: CPT | Mod: 79,LT

## 2025-09-10 PROCEDURE — 99024 POSTOP FOLLOW-UP VISIT: CPT
